# Patient Record
Sex: FEMALE | Race: WHITE | NOT HISPANIC OR LATINO | Employment: OTHER | URBAN - METROPOLITAN AREA
[De-identification: names, ages, dates, MRNs, and addresses within clinical notes are randomized per-mention and may not be internally consistent; named-entity substitution may affect disease eponyms.]

---

## 2017-01-01 ENCOUNTER — HOSPITAL ENCOUNTER (OUTPATIENT)
Dept: MAMMOGRAPHY | Facility: HOSPITAL | Age: 82
Discharge: HOME | End: 2017-03-08
Attending: FAMILY MEDICINE | Admitting: FAMILY MEDICINE

## 2017-01-01 ENCOUNTER — TRANSCRIBE ORDERS (OUTPATIENT)
Dept: FAMILY MEDICINE CLINIC | Facility: CLINIC | Age: 82
End: 2017-01-01

## 2017-01-01 ENCOUNTER — OFFICE VISIT (OUTPATIENT)
Dept: CARDIOLOGY | Facility: CLINIC | Age: 82
End: 2017-01-01

## 2017-01-01 ENCOUNTER — HOSPITAL ENCOUNTER (OUTPATIENT)
Dept: CARDIOLOGY | Facility: HOSPITAL | Age: 82
Discharge: HOME | End: 2017-10-11
Attending: INTERNAL MEDICINE | Admitting: INTERNAL MEDICINE

## 2017-01-01 ENCOUNTER — OFFICE VISIT (OUTPATIENT)
Dept: FAMILY MEDICINE CLINIC | Facility: CLINIC | Age: 82
End: 2017-01-01

## 2017-01-01 ENCOUNTER — TELEPHONE (OUTPATIENT)
Dept: PAIN MEDICINE | Facility: CLINIC | Age: 82
End: 2017-01-01

## 2017-01-01 ENCOUNTER — TELEPHONE (OUTPATIENT)
Dept: CARDIOLOGY | Facility: CLINIC | Age: 82
End: 2017-01-01

## 2017-01-01 ENCOUNTER — TELEPHONE (OUTPATIENT)
Dept: FAMILY MEDICINE CLINIC | Facility: CLINIC | Age: 82
End: 2017-01-01

## 2017-01-01 ENCOUNTER — OFFICE VISIT (OUTPATIENT)
Dept: PAIN MEDICINE | Facility: CLINIC | Age: 82
End: 2017-01-01

## 2017-01-01 VITALS
SYSTOLIC BLOOD PRESSURE: 163 MMHG | HEART RATE: 79 BPM | WEIGHT: 157.4 LBS | BODY MASS INDEX: 30.9 KG/M2 | DIASTOLIC BLOOD PRESSURE: 67 MMHG | HEIGHT: 60 IN

## 2017-01-01 VITALS
WEIGHT: 156 LBS | HEIGHT: 60 IN | SYSTOLIC BLOOD PRESSURE: 132 MMHG | OXYGEN SATURATION: 96 % | BODY MASS INDEX: 30.63 KG/M2 | DIASTOLIC BLOOD PRESSURE: 80 MMHG | RESPIRATION RATE: 18 BRPM | HEART RATE: 75 BPM | TEMPERATURE: 97.6 F

## 2017-01-01 VITALS
BODY MASS INDEX: 30.43 KG/M2 | WEIGHT: 155 LBS | HEIGHT: 60 IN | OXYGEN SATURATION: 94 % | HEART RATE: 71 BPM | DIASTOLIC BLOOD PRESSURE: 80 MMHG | SYSTOLIC BLOOD PRESSURE: 138 MMHG | TEMPERATURE: 99.1 F

## 2017-01-01 DIAGNOSIS — K21.9 GASTROESOPHAGEAL REFLUX DISEASE WITHOUT ESOPHAGITIS: ICD-10-CM

## 2017-01-01 DIAGNOSIS — F32.9 REACTIVE DEPRESSION: ICD-10-CM

## 2017-01-01 DIAGNOSIS — Z12.31 VISIT FOR SCREENING MAMMOGRAM: Primary | ICD-10-CM

## 2017-01-01 DIAGNOSIS — G47.00 INSOMNIA, UNSPECIFIED TYPE: ICD-10-CM

## 2017-01-01 DIAGNOSIS — I25.10 CORONARY ARTERY DISEASE INVOLVING NATIVE CORONARY ARTERY OF NATIVE HEART WITHOUT ANGINA PECTORIS: Primary | ICD-10-CM

## 2017-01-01 DIAGNOSIS — E10.8 DIABETES MELLITUS TYPE 1 WITH COMPLICATIONS (HCC): ICD-10-CM

## 2017-01-01 DIAGNOSIS — M48.062 LUMBAR STENOSIS WITH NEUROGENIC CLAUDICATION: ICD-10-CM

## 2017-01-01 DIAGNOSIS — I48.91 ATRIAL FIBRILLATION, UNSPECIFIED TYPE (HCC): ICD-10-CM

## 2017-01-01 DIAGNOSIS — F32.A ANXIETY AND DEPRESSION: ICD-10-CM

## 2017-01-01 DIAGNOSIS — Z95.1 S/P CABG X 3: ICD-10-CM

## 2017-01-01 DIAGNOSIS — F41.9 ANXIETY AND DEPRESSION: ICD-10-CM

## 2017-01-01 DIAGNOSIS — I21.4 NSTEMI (NON-ST ELEVATED MYOCARDIAL INFARCTION) (HCC): ICD-10-CM

## 2017-01-01 DIAGNOSIS — I10 ESSENTIAL HYPERTENSION: ICD-10-CM

## 2017-01-01 DIAGNOSIS — F41.9 ANXIETY AND DEPRESSION: Primary | ICD-10-CM

## 2017-01-01 DIAGNOSIS — I35.0 NONRHEUMATIC AORTIC VALVE STENOSIS: ICD-10-CM

## 2017-01-01 DIAGNOSIS — E10.40 TYPE 1 DIABETES MELLITUS WITH DIABETIC NEUROPATHY (HCC): Primary | ICD-10-CM

## 2017-01-01 DIAGNOSIS — M79.18 MYOFASCIAL PAIN: ICD-10-CM

## 2017-01-01 DIAGNOSIS — G56.03 CARPAL TUNNEL SYNDROME, BILATERAL: ICD-10-CM

## 2017-01-01 DIAGNOSIS — Z12.31 VISIT FOR SCREENING MAMMOGRAM: ICD-10-CM

## 2017-01-01 DIAGNOSIS — Z51.89 ENCOUNTER FOR OTHER SPECIFIED AFTERCARE: ICD-10-CM

## 2017-01-01 DIAGNOSIS — E10.42 DIABETIC POLYNEUROPATHY ASSOCIATED WITH TYPE 1 DIABETES MELLITUS (HCC): ICD-10-CM

## 2017-01-01 DIAGNOSIS — Z01.812 PRE-PROCEDURE LAB EXAM: ICD-10-CM

## 2017-01-01 DIAGNOSIS — K43.9 VENTRAL HERNIA WITHOUT OBSTRUCTION OR GANGRENE: ICD-10-CM

## 2017-01-01 DIAGNOSIS — F32.A ANXIETY AND DEPRESSION: Primary | ICD-10-CM

## 2017-01-01 LAB
BH CV ECHO MEAS - AO MAX PG (FULL): 29.1 MMHG
BH CV ECHO MEAS - AO MAX PG: 34.1 MMHG
BH CV ECHO MEAS - AO MEAN PG (FULL): 18.3 MMHG
BH CV ECHO MEAS - AO MEAN PG: 21.4 MMHG
BH CV ECHO MEAS - AO ROOT AREA (BSA CORRECTED): 1.4
BH CV ECHO MEAS - AO ROOT AREA: 4.2 CM^2
BH CV ECHO MEAS - AO ROOT DIAM: 2.3 CM
BH CV ECHO MEAS - AO V2 MAX: 291.8 CM/SEC
BH CV ECHO MEAS - AO V2 MEAN: 222.6 CM/SEC
BH CV ECHO MEAS - AO V2 VTI: 77.6 CM
BH CV ECHO MEAS - AVA(I,A): 1.3 CM^2
BH CV ECHO MEAS - AVA(I,D): 1.3 CM^2
BH CV ECHO MEAS - AVA(V,A): 1.2 CM^2
BH CV ECHO MEAS - AVA(V,D): 1.2 CM^2
BH CV ECHO MEAS - BSA(HAYCOCK): 1.7 M^2
BH CV ECHO MEAS - BSA: 1.7 M^2
BH CV ECHO MEAS - BZI_BMI: 29.5 KILOGRAMS/M^2
BH CV ECHO MEAS - BZI_METRIC_HEIGHT: 152.4 CM
BH CV ECHO MEAS - BZI_METRIC_WEIGHT: 68.5 KG
BH CV ECHO MEAS - CONTRAST EF (2CH): 50 ML/M^2
BH CV ECHO MEAS - CONTRAST EF 4CH: 57.7 ML/M^2
BH CV ECHO MEAS - EDV(CUBED): 108.3 ML
BH CV ECHO MEAS - EDV(MOD-SP2): 22 ML
BH CV ECHO MEAS - EDV(MOD-SP4): 26 ML
BH CV ECHO MEAS - EDV(TEICH): 105.8 ML
BH CV ECHO MEAS - EF(CUBED): 64.6 %
BH CV ECHO MEAS - EF(MOD-SP2): 50 %
BH CV ECHO MEAS - EF(MOD-SP4): 57.7 %
BH CV ECHO MEAS - EF(TEICH): 56 %
BH CV ECHO MEAS - ESV(CUBED): 38.4 ML
BH CV ECHO MEAS - ESV(MOD-SP2): 11 ML
BH CV ECHO MEAS - ESV(MOD-SP4): 11 ML
BH CV ECHO MEAS - ESV(TEICH): 46.5 ML
BH CV ECHO MEAS - FS: 29.2 %
BH CV ECHO MEAS - IVS/LVPW: 0.87
BH CV ECHO MEAS - IVSD: 0.98 CM
BH CV ECHO MEAS - LA DIMENSION: 4.7 CM
BH CV ECHO MEAS - LA/AO: 2
BH CV ECHO MEAS - LAT PEAK E' VEL: 6 CM/SEC
BH CV ECHO MEAS - LV DIASTOLIC VOL/BSA (35-75): 15.7 ML/M^2
BH CV ECHO MEAS - LV MASS(C)D: 180.5 GRAMS
BH CV ECHO MEAS - LV MASS(C)DI: 109 GRAMS/M^2
BH CV ECHO MEAS - LV MAX PG: 4.9 MMHG
BH CV ECHO MEAS - LV MEAN PG: 3.2 MMHG
BH CV ECHO MEAS - LV SYSTOLIC VOL/BSA (12-30): 6.6 ML/M^2
BH CV ECHO MEAS - LV V1 MAX: 111.1 CM/SEC
BH CV ECHO MEAS - LV V1 MEAN: 84.9 CM/SEC
BH CV ECHO MEAS - LV V1 VTI: 31.7 CM
BH CV ECHO MEAS - LVIDD: 4.8 CM
BH CV ECHO MEAS - LVIDS: 3.4 CM
BH CV ECHO MEAS - LVLD AP2: 5.1 CM
BH CV ECHO MEAS - LVLD AP4: 5.3 CM
BH CV ECHO MEAS - LVLS AP2: 4.2 CM
BH CV ECHO MEAS - LVLS AP4: 4.8 CM
BH CV ECHO MEAS - LVOT AREA (M): 3.1 CM^2
BH CV ECHO MEAS - LVOT AREA: 3.1 CM^2
BH CV ECHO MEAS - LVOT DIAM: 2 CM
BH CV ECHO MEAS - LVPWD: 1.1 CM
BH CV ECHO MEAS - MED PEAK E' VEL: 4.5 CM/SEC
BH CV ECHO MEAS - MV DEC SLOPE: 828.6 CM/SEC^2
BH CV ECHO MEAS - MV MAX PG: 12.9 MMHG
BH CV ECHO MEAS - MV MEAN PG: 6.1 MMHG
BH CV ECHO MEAS - MV P1/2T MAX VEL: 182.5 CM/SEC
BH CV ECHO MEAS - MV P1/2T: 64.5 MSEC
BH CV ECHO MEAS - MV V2 MAX: 179.5 CM/SEC
BH CV ECHO MEAS - MV V2 MEAN: 114.7 CM/SEC
BH CV ECHO MEAS - MV V2 VTI: 51.3 CM
BH CV ECHO MEAS - MVA P1/2T LCG: 1.2 CM^2
BH CV ECHO MEAS - MVA(P1/2T): 3.4 CM^2
BH CV ECHO MEAS - MVA(VTI): 1.9 CM^2
BH CV ECHO MEAS - PA ACC SLOPE: 557 CM/SEC^2
BH CV ECHO MEAS - PA ACC TIME: 0.13 SEC
BH CV ECHO MEAS - PA PR(ACCEL): 18.8 MMHG
BH CV ECHO MEAS - RAP SYSTOLE: 3 MMHG
BH CV ECHO MEAS - RVSP: 31 MMHG
BH CV ECHO MEAS - SI(AO): 195.4 ML/M^2
BH CV ECHO MEAS - SI(CUBED): 42.2 ML/M^2
BH CV ECHO MEAS - SI(LVOT): 59.4 ML/M^2
BH CV ECHO MEAS - SI(MOD-SP2): 6.6 ML/M^2
BH CV ECHO MEAS - SI(MOD-SP4): 9.1 ML/M^2
BH CV ECHO MEAS - SI(TEICH): 35.8 ML/M^2
BH CV ECHO MEAS - SV(AO): 323.6 ML
BH CV ECHO MEAS - SV(CUBED): 69.9 ML
BH CV ECHO MEAS - SV(LVOT): 98.3 ML
BH CV ECHO MEAS - SV(MOD-SP2): 11 ML
BH CV ECHO MEAS - SV(MOD-SP4): 15 ML
BH CV ECHO MEAS - SV(TEICH): 59.2 ML
BH CV ECHO MEAS - TAPSE (>1.6): 1.1 CM2
BH CV ECHO MEAS - TR MAX VEL: 285.8 CM/SEC
BH CV XLRA - RV BASE: 2.6 CM
BH CV XLRA - RV LENGTH: 4.5 CM
BH CV XLRA - RV MID: 1.9 CM
BH CV XLRA - TDI S': 9.2 CM/SEC
HBA1C MFR BLD: 7.6 %
LEFT ATRIUM VOLUME INDEX: 29 ML/M2

## 2017-01-01 PROCEDURE — G0202 SCR MAMMO BI INCL CAD: HCPCS

## 2017-01-01 PROCEDURE — 77063 BREAST TOMOSYNTHESIS BI: CPT | Performed by: RADIOLOGY

## 2017-01-01 PROCEDURE — 93306 TTE W/DOPPLER COMPLETE: CPT | Performed by: INTERNAL MEDICINE

## 2017-01-01 PROCEDURE — G0202 SCR MAMMO BI INCL CAD: HCPCS | Performed by: RADIOLOGY

## 2017-01-01 PROCEDURE — 83036 HEMOGLOBIN GLYCOSYLATED A1C: CPT | Performed by: FAMILY MEDICINE

## 2017-01-01 PROCEDURE — 93306 TTE W/DOPPLER COMPLETE: CPT

## 2017-01-01 PROCEDURE — 99204 OFFICE O/P NEW MOD 45 MIN: CPT | Performed by: ANESTHESIOLOGY

## 2017-01-01 PROCEDURE — 99213 OFFICE O/P EST LOW 20 MIN: CPT | Performed by: FAMILY MEDICINE

## 2017-01-01 PROCEDURE — 77063 BREAST TOMOSYNTHESIS BI: CPT

## 2017-01-01 PROCEDURE — 99214 OFFICE O/P EST MOD 30 MIN: CPT | Performed by: INTERNAL MEDICINE

## 2017-01-01 RX ORDER — RANITIDINE 150 MG/1
150 TABLET ORAL 2 TIMES DAILY
Qty: 60 TABLET | Refills: 11 | Status: SHIPPED | OUTPATIENT
Start: 2017-01-01

## 2017-01-01 RX ORDER — RANITIDINE 150 MG/1
150 TABLET ORAL 2 TIMES DAILY
Qty: 60 TABLET | Refills: 0 | Status: SHIPPED | OUTPATIENT
Start: 2017-01-01 | End: 2017-01-01

## 2017-01-01 RX ORDER — ESCITALOPRAM OXALATE 10 MG/1
10 TABLET ORAL DAILY
Qty: 30 TABLET | Refills: 11 | Status: SHIPPED | OUTPATIENT
Start: 2017-01-01

## 2017-01-01 RX ORDER — ACETAMINOPHEN AND CODEINE PHOSPHATE 300; 30 MG/1; MG/1
1 TABLET ORAL EVERY 4 HOURS PRN
COMMUNITY
Start: 2017-01-01 | End: 2018-01-01 | Stop reason: SDUPTHER

## 2017-01-03 ENCOUNTER — OFFICE VISIT (OUTPATIENT)
Dept: CARDIAC SURGERY | Facility: CLINIC | Age: 82
End: 2017-01-03

## 2017-01-03 VITALS
WEIGHT: 158.4 LBS | HEIGHT: 60 IN | OXYGEN SATURATION: 86 % | SYSTOLIC BLOOD PRESSURE: 149 MMHG | HEART RATE: 77 BPM | BODY MASS INDEX: 31.1 KG/M2 | DIASTOLIC BLOOD PRESSURE: 75 MMHG

## 2017-01-03 DIAGNOSIS — Z95.1 S/P CABG (CORONARY ARTERY BYPASS GRAFT): Primary | ICD-10-CM

## 2017-01-03 PROCEDURE — 99024 POSTOP FOLLOW-UP VISIT: CPT | Performed by: THORACIC SURGERY (CARDIOTHORACIC VASCULAR SURGERY)

## 2017-01-03 PROCEDURE — 71020 CHG CHEST X-RAY 2 VW: CPT | Performed by: THORACIC SURGERY (CARDIOTHORACIC VASCULAR SURGERY)

## 2017-01-03 NOTE — PROGRESS NOTES
01/03/2017  Patient Information  Debora Owen                                                                                          [address] [place] [address]   1935  'PCP/Referring Physician'  Sanjiv Mo MD  [phone]  No ref. provider found    Chief Complaint   Patient presents with   • Follow-up     1 MO F/U PER SANJIV        History of Present Illness:  Mrs Owen returns to clinic after CBG X 3 on 10/27/16 for NSTEMI.  She has no complaints at today's appointment.  Debora denies chest pain or shortness of breath.  She feels better after surgery as compared to her preoperative state.        Patient Active Problem List   Diagnosis   • NSTEMI (non-ST elevated myocardial infarction)   • Type I diabetes mellitus   • S/P CABG x 3   • CAD (coronary artery disease)   • Postoperative anemia   • Atrial fibrillation   • Anxiety disorder   • Carpal tunnel syndrome, bilateral   • Depression   • Gastroesophageal reflux disease   • Insomnia   • Neuropathy   • Neuropathy of hand   • Neuropathy of upper extremity   • Osteoarthritis of both hips   • Peripheral neuropathy   • Sciatica   • SOB (shortness of breath)     Past Medical History   Diagnosis Date   • Conjunctivitis    • Diabetes mellitus    • Esophageal reflux    • Glaucoma    • Hard of hearing    • Hyperlipidemia    • Hypertension    • Upper respiratory infection    • Urinary tract infection      Past Surgical History   Procedure Laterality Date   • Cataract extraction Bilateral    • Glaucoma surgery Bilateral    • Tonsillectomy and adenoidectomy     • Cardiac catheterization N/A 10/20/2016     Procedure: Left Heart Cath +/- cbi;  Surgeon: Gama Elmore MD;  Location: [facility] INVASIVE LOCATION;  Service:    • Carpal tunnel release Bilateral      2016   • Coronary artery bypass graft N/A 10/27/2016     Procedure: TONNY, MEDIAN STERNOTOMY, CORONARY ARTERY BYPASS GRAFT X 3   WITH LEFT INTERNAL MAMMARY ARTERY GRAFT AND ENDOSCOPIC VEIN HARVEST OF  THE RIGHT GREATER SAPHENOUS VEIN, WITH EXPLORATION OF THE LEFT GREATER SAPHENOUS VEIN;  Surgeon: Christophe Noe MD;  Location: [facility];  Service:        Current Outpatient Prescriptions:   •  acetaminophen (TYLENOL) 500 MG tablet, Take 500 mg by mouth 2 (Two) Times a Day., Disp: , Rfl:   •  amiodarone (PACERONE) 200 MG tablet, Take 1 tablet by mouth Daily., Disp: 30 tablet, Rfl: 6  •  aspirin 81 MG EC tablet, Take 81 mg by mouth Daily., Disp: , Rfl:   •  Calcium-Magnesium-Vitamin D (CALCIUM 500 PO), Take 2 tablets by mouth Daily., Disp: , Rfl:   •  furosemide (LASIX) 40 MG tablet, Take 40 mg by mouth 2 (Two) Times a Day., Disp: , Rfl:   •  Glucosamine-Chondroitin (OSTEO BI-FLEX REGULAR STRENGTH PO), Take 1,500 mg by mouth Daily., Disp: , Rfl:   •  insulin aspart (NOVOLOG) 100 UNIT/ML injection, Inject  under the skin 3 (Three) Times a Day Before Meals. SSI based on carbs, Disp: , Rfl:   •  insulin glargine (LANTUS) 100 UNIT/ML injection, Inject 24 Units under the skin Every Night., Disp: , Rfl:   •  losartan (COZAAR) 25 MG tablet, Take 4 tablets by mouth Daily., Disp: 30 tablet, Rfl: 11  •  metoprolol tartrate (LOPRESSOR) 25 MG tablet, Take 0.5 tablets by mouth Every 12 (Twelve) Hours., Disp: 60 tablet, Rfl: 11  •  Multiple Vitamins-Minerals (MULTIVITAMIN ADULT PO), Take 1 tablet by mouth Daily., Disp: , Rfl:   •  pravastatin (PRAVACHOL) 20 MG tablet, Take 20 mg by mouth Daily., Disp: , Rfl:   •  ranitidine (ZANTAC) 150 MG tablet, Take 150 mg by mouth 2 (Two) Times a Day., Disp: , Rfl:   Allergies   Allergen Reactions   • Augmentin [Amoxicillin-Pot Clavulanate] Rash   • Cephalexin Rash     Social History     Social History   • Marital status:      Spouse name: N/A   • Number of children: 0   • Years of education: N/A     Occupational History   • [employer]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    Patient lives alone at her home due to  residing at the nursing home.     Family History   Problem Relation Age of Onset   • Coronary artery disease Maternal Aunt    • Coronary artery disease Maternal Uncle    • Alzheimer's disease Sister    • Macular degeneration Sister    • Cancer Mother    • Cancer Father      ROS  Vitals:    01/03/17 1137   BP: 149/75   BP Location: Right arm   Patient Position: Sitting   Pulse: 77   SpO2: (!) 86%   Weight: 158 lb 6.4 oz (71.8 kg)   Height: 60\" (152.4 cm)      Physical Exam   Constitutional: She is oriented to person, place, and time. She appears well-developed and well-nourished. No distress.   HENT:   Head: Normocephalic and atraumatic.   Eyes: Conjunctivae are normal. No scleral icterus.   Neck: Normal range of motion. No JVD present. No tracheal deviation present.   Cardiovascular: Normal rate, regular rhythm and normal heart sounds.  Exam reveals no gallop and no friction rub.    No murmur heard.  Pulmonary/Chest: Effort normal and breath sounds normal. No respiratory distress. She has no wheezes. She has no rales.   Musculoskeletal: Normal range of motion. She exhibits no deformity.   Neurological: She is alert and oriented to person, place, and time.   Skin: No rash noted. She is not diaphoretic. No erythema.   Sternotomy and vein harvest site incisions well healed with no erythema or drainage.  Sternum intact with cough   Psychiatric: She has a normal mood and affect. Her behavior is normal. Judgment and thought content normal.       Labs/Imaging:  -CXR performed in office, personally reviewed, demonstrates sharp costophrenic angles, intact sternal wires, no pleural effusions or cardiomegaly.      Assessment/Plan:  81 year old  female with a history of hypertension, hyperlipidemia and " diabetes presents after CABG x 3 on 10/27/16 for NSTEMI. I encouraged her to continue physical activity and pulmonary toilet.  Lifelong aspirin, statin and beta blocker therapy.  Debora will follow up with Dr. Elmore who may choose to discontinue the amiodarone.  She may return to surgical clinic as needed.        Patient Active Problem List   Diagnosis   • NSTEMI (non-ST elevated myocardial infarction)   • Type I diabetes mellitus   • S/P CABG x 3   • CAD (coronary artery disease)   • Postoperative anemia   • Atrial fibrillation   • Anxiety disorder   • Carpal tunnel syndrome, bilateral   • Depression   • Gastroesophageal reflux disease   • Insomnia   • Neuropathy   • Neuropathy of hand   • Neuropathy of upper extremity   • Osteoarthritis of both hips   • Peripheral neuropathy   • Sciatica   • SOB (shortness of breath)     Signed by:

## 2017-01-12 ENCOUNTER — OFFICE VISIT (OUTPATIENT)
Dept: FAMILY MEDICINE CLINIC | Facility: CLINIC | Age: 82
End: 2017-01-12

## 2017-01-12 VITALS
DIASTOLIC BLOOD PRESSURE: 72 MMHG | BODY MASS INDEX: 32.79 KG/M2 | TEMPERATURE: 98 F | HEART RATE: 75 BPM | HEIGHT: 60 IN | WEIGHT: 167 LBS | SYSTOLIC BLOOD PRESSURE: 132 MMHG | OXYGEN SATURATION: 97 %

## 2017-01-12 DIAGNOSIS — F32.9 REACTIVE DEPRESSION: ICD-10-CM

## 2017-01-12 DIAGNOSIS — G89.29 CHRONIC BILATERAL LOW BACK PAIN WITHOUT SCIATICA: Primary | ICD-10-CM

## 2017-01-12 DIAGNOSIS — I25.119 CORONARY ARTERY DISEASE INVOLVING NATIVE CORONARY ARTERY OF NATIVE HEART WITH ANGINA PECTORIS (HCC): ICD-10-CM

## 2017-01-12 DIAGNOSIS — M54.50 CHRONIC BILATERAL LOW BACK PAIN WITHOUT SCIATICA: Primary | ICD-10-CM

## 2017-01-12 PROCEDURE — 99214 OFFICE O/P EST MOD 30 MIN: CPT | Performed by: FAMILY MEDICINE

## 2017-01-12 RX ORDER — HYDROCODONE BITARTRATE AND ACETAMINOPHEN 5; 325 MG/1; MG/1
1 TABLET ORAL EVERY 8 HOURS PRN
Qty: 60 TABLET | Refills: 0
Start: 2017-01-12 | End: 2017-01-01

## 2017-01-12 RX ORDER — ESCITALOPRAM OXALATE 10 MG/1
10 TABLET ORAL DAILY
Qty: 30 TABLET | Refills: 5 | Status: SHIPPED | OUTPATIENT
Start: 2017-01-12 | End: 2017-01-01 | Stop reason: SDUPTHER

## 2017-01-12 NOTE — PROGRESS NOTES
"Raquel Owen is a 81 y.o. female.     History of Present Illness   The patient is here for a follow up on Coronary Bypass surgery in October 19 and discharged on Nov 3 to Rehab. She is accompanied today by her niece.    She states she is doing well.  Denies any chest pain or shortness of breath for about 2 weeks now.    She is went to [facility] on Nov 3 rd through Dec 21st.    She has been home for about 3 weeks now.  She states her appetite is good.    Seeing Dr Desai for the diabetes. A1c in Oct was 7.7%. She is to have a new insulin pump. Workup of this coming Monday.    Also c/o Osteoarthritis of back and both hips. She is wearing a back brace and this helps some. Having to sleep in the recliner due to back pain  She did see an Orthopedist in [place] in October.  States she is doing Physical Therapy at this time.    The following portions of the patient's history were reviewed and updated as appropriate: allergies, current medications, past social history and problem list.    Review of Systems   Constitutional: Positive for fatigue.   Musculoskeletal: Positive for back pain (low back).   Psychiatric/Behavioral: Positive for dysphoric mood (depression).       Objective   Visit Vitals   • /72   • Pulse 75   • Temp 98 °F (36.7 °C)   • Ht 60\" (152.4 cm)   • Wt 167 lb (75.8 kg)   • SpO2 97%   • BMI 32.61 kg/m2     Physical Exam   Constitutional: She appears well-developed and well-nourished.   Cardiovascular: Normal rate, regular rhythm and normal heart sounds.    Pulmonary/Chest: Effort normal and breath sounds normal.   Nursing note and vitals reviewed.      Assessment/Plan   Problem List Items Addressed This Visit        Cardiovascular and Mediastinum    CAD (coronary artery disease)       Other    Depression      Other Visit Diagnoses     Chronic bilateral low back pain without sciatica    -  Primary              Drink plenty fluids.    Start Escitalopram 10 mg daily " #30+5.    Written Rx for Hydrocodone 5 mg three times a day if needed #60+0.    Refer to Pain Management. Dr Stewart.    Follow up as needed.                                Scribed for Dr Tai Segal by Kirstin Medeiros CMA.    I, Tai Segal MD, personally performed the services described in this documentation, as scribed by Kirstin Medeiros in my presence, and is both accurate and complete.

## 2017-01-16 ENCOUNTER — OFFICE VISIT (OUTPATIENT)
Dept: CARDIOLOGY | Facility: CLINIC | Age: 82
End: 2017-01-16

## 2017-01-16 VITALS
HEART RATE: 74 BPM | BODY MASS INDEX: 32.55 KG/M2 | WEIGHT: 165.8 LBS | DIASTOLIC BLOOD PRESSURE: 58 MMHG | HEIGHT: 60 IN | SYSTOLIC BLOOD PRESSURE: 136 MMHG

## 2017-01-16 DIAGNOSIS — I48.91 ATRIAL FIBRILLATION, UNSPECIFIED TYPE (HCC): ICD-10-CM

## 2017-01-16 DIAGNOSIS — I25.10 CORONARY ARTERY DISEASE INVOLVING NATIVE CORONARY ARTERY OF NATIVE HEART WITHOUT ANGINA PECTORIS: Primary | ICD-10-CM

## 2017-01-16 DIAGNOSIS — I35.0 NONRHEUMATIC AORTIC VALVE STENOSIS: ICD-10-CM

## 2017-01-16 PROCEDURE — 99214 OFFICE O/P EST MOD 30 MIN: CPT | Performed by: INTERNAL MEDICINE

## 2017-01-16 PROCEDURE — 93000 ELECTROCARDIOGRAM COMPLETE: CPT | Performed by: INTERNAL MEDICINE

## 2017-01-16 NOTE — PROGRESS NOTES
[facility] AT [address], [address]  [place], [place], [address] [phone]  [email or web address].[facility]           OUTPATIENT CLINIC FOLLOW UP NOTE    Encounter Date:01/16/2017    Patient Care Team:  Patient Care Team:  Sanjiv Mo MD as PCP - General (Adolescent Medicine)  Tai Segal MD as PCP - Family Medicine  Christophe Noe MD as Surgeon (Cardiothoracic Surgery)  Lamberto Ellison MD as Consulting Physician (Hand Surgery)    Subjective:   Reason for consultation:   Chief Complaint   Patient presents with   • Follow-up     CAD       HPI:    Debora Owen is a 81 y.o. female.  History of Present Illness  The patient is an 81-year-old female with a history of an non-ST elevation MI in 10/2016 status post three-vessel bypass with Dr. Noe, postoperative atrial fibrillation, mild to moderate AS, who presents for follow-up.    Patient has done well since discharge and is currently without chest pain or exertional dyspnea suggestive of angina.  Additionally she has not had episodes of lightheadedness or syncope suggestive of progressive aortic stenosis.  Lastly she denies palpitations suggestive of recurrent atrial fibrillation on amiodarone therapy.      PFSH:  Patient Active Problem List   Diagnosis   • NSTEMI (non-ST elevated myocardial infarction)   • Type I diabetes mellitus   • S/P CABG x 3   • CAD (coronary artery disease)   • Postoperative anemia   • Atrial fibrillation   • Anxiety disorder   • Carpal tunnel syndrome, bilateral   • Depression   • Gastroesophageal reflux disease   • Insomnia   • Neuropathy   • Neuropathy of hand   • Neuropathy of upper extremity   • Osteoarthritis of both hips   • Peripheral neuropathy   • Sciatica   • SOB (shortness of breath)         Current Outpatient Prescriptions:   •  acetaminophen (TYLENOL) 500 MG tablet, Take 500 mg by mouth 2 (Two) Times a Day., Disp: , Rfl:   •  amiodarone (PACERONE) 200 MG tablet, Take 1 tablet by  mouth Daily., Disp: 30 tablet, Rfl: 6  •  aspirin 81 MG EC tablet, Take 81 mg by mouth Daily., Disp: , Rfl:   •  Calcium-Magnesium-Vitamin D (CALCIUM 500 PO), Take 2 tablets by mouth Daily., Disp: , Rfl:   •  escitalopram (LEXAPRO) 10 MG tablet, Take 1 tablet by mouth Daily., Disp: 30 tablet, Rfl: 5  •  furosemide (LASIX) 40 MG tablet, Take 40 mg by mouth 2 (Two) Times a Day., Disp: , Rfl:   •  Glucosamine-Chondroitin (OSTEO BI-FLEX REGULAR STRENGTH PO), Take 1,500 mg by mouth Daily., Disp: , Rfl:   •  HYDROcodone-acetaminophen (NORCO) 5-325 MG per tablet, Take 1 tablet by mouth Every 8 (Eight) Hours As Needed for moderate pain (4-6)., Disp: 60 tablet, Rfl: 0  •  insulin aspart (NOVOLOG) 100 UNIT/ML injection, Inject  under the skin 3 (Three) Times a Day Before Meals. SSI based on carbs, Disp: , Rfl:   •  insulin glargine (LANTUS) 100 UNIT/ML injection, Inject 24 Units under the skin Every Night., Disp: , Rfl:   •  losartan (COZAAR) 25 MG tablet, Take 4 tablets by mouth Daily., Disp: 30 tablet, Rfl: 11  •  metoprolol tartrate (LOPRESSOR) 25 MG tablet, Take 0.5 tablets by mouth Every 12 (Twelve) Hours., Disp: 60 tablet, Rfl: 11  •  Multiple Vitamins-Minerals (MULTIVITAMIN ADULT PO), Take 1 tablet by mouth Daily., Disp: , Rfl:   •  pravastatin (PRAVACHOL) 20 MG tablet, Take 20 mg by mouth Daily., Disp: , Rfl:   •  ranitidine (ZANTAC) 150 MG tablet, Take 150 mg by mouth 2 (Two) Times a Day., Disp: , Rfl:     Allergies   Allergen Reactions   • Augmentin [Amoxicillin-Pot Clavulanate] Rash   • Cephalexin Rash        reports that she has never smoked. She has never used smokeless tobacco.    Family History   Problem Relation Age of Onset   • Coronary artery disease Maternal Aunt    • Coronary artery disease Maternal Uncle    • Alzheimer's disease Sister    • Macular degeneration Sister    • Cancer Mother    • Cancer Father        Review of Systems:  Negative for exertional chest pain, dyspnea with exertion, orthopnea, PND,  "lower extremity edema, palpitations, lightheadedness, syncope.   Review of Systems  All other systems reviewed and are negative.    Objective:   Blood pressure 136/58, pulse 74, height 60\" (152.4 cm), weight 165 lb 12.8 oz (75.2 kg).  Physical Exam  CONSTITUTIONAL: No acute distress, normal affect  RESPIRATORY: Normal effort. Clear to auscultation bilaterally without wheezing or rales  CARDIOVASCULAR: Jugular venous pressure within normal limits. Carotids with normal upstrokes without bruits.  Regular rate and rhythm with normal S1. NAHOMI at RUSB with radiation to carotid. Soft but present S2. Without gallop or rub.  CHEST: Incision CDI  PERIPHERAL VASCULAR: Normal radial pulses bilaterally. There is no lower extremity edema bilaterally.    Labs:  Lab Results   Component Value Date    ALT 20 10/23/2016    AST 23 10/23/2016     Lab Results   Component Value Date    CHOL 190 10/19/2016    TRIG 124 10/19/2016    HDL 61 (H) 10/19/2016    LDLDIRECT 109 10/19/2016    CREATININE 0.90 10/31/2016       Diagnostic Data:      ECG 12 Lead  Date/Time: 1/16/2017 3:37 PM  Performed by: JARED HWANG  Authorized by: JARED HWANG   Rhythm: sinus rhythm  Q waves: V1, V2 and V3  Comments: Anteroseptal infarct        Cath 10/2016  · Severe multi-vessel CAD involving the proximal LAD and mid circumflex  · Ostial 60% LAD stenosis followed by a 100% occluded proximal LAD just distal to the take off of D1 with predominantly right to left collateral filling of the mid to distal LAD.  · Functionally significant 60% mid circumflex stenosis with an iFR of 0.86  · Normal LVEF 60% with concentric hypertrophy but with no regional wall abnormalities  · Mitral annular calcification  · Mild aortic stenosis with a peak-to-peak gradient of 20mmHg    TTE 10/2016  · Left ventricular function is normal. Estimated EF = 65%.  · All left ventricular wall segments contract normally.  · Left ventricular diastolic dysfunction (grade I a) consistent with " impaired relaxation.  · Mild mitral valve stenosis is present  · Mild to moderate aortic valve stenosis is present.    Assessment and Plan:   Debora was seen today for follow-up.    Diagnoses and all orders for this visit:    Coronary artery disease involving native coronary artery of native heart without angina pectoris  -CCS class 0, continue current CAD medications  -On metoprolol, but has felt potentially a little bit fatigued or down in her mood since discharge.  Possibly secondary to the metoprolol but could simply be depression.  She was recently started on antidepressant.  Will consider discontinue metoprolol in the future if continues to do well.  She has a normal left reticular ejection fraction.    Atrial fibrillation, unspecified type  -Very brief postoperative age of fibrillation which reverted quickly to normal sinus rhythm on amiodarone.  Discontinue amiodarone by mouth    Aortic Stenosis  -Mild to moderate in severity, no symptoms of aortic stenosis, clinical monitoring    - Dietary and exercise counseling was provided during this visit  - Return in about 6 months (around 7/16/2017).    Gama Elmore MD, MSc, [facility]  Interventional Cardiology  [facility] at [facility]

## 2017-01-16 NOTE — MR AVS SNAPSHOT
Debora Owen   1/16/2017 3:00 PM   Office Visit    Dept Phone:  [phone]   Encounter #:  [id number]    Provider:  Gama Elmore MD   Department:  [facility]                Your Full Care Plan              Today's Medication Changes          These changes are accurate as of: 1/16/17  3:48 PM.  If you have any questions, ask your nurse or doctor.               Stop taking medication(s)listed here:     amiodarone 200 MG tablet   Commonly known as:  PACERONE   Stopped by:  Gama Elmore MD                      Your Updated Medication List          This list is accurate as of: 1/16/17  3:48 PM.  Always use your most recent med list.                acetaminophen 500 MG tablet   Commonly known as:  TYLENOL       aspirin 81 MG EC tablet       CALCIUM 500 PO       escitalopram 10 MG tablet   Commonly known as:  LEXAPRO   Take 1 tablet by mouth Daily.       furosemide 40 MG tablet   Commonly known as:  LASIX       HYDROcodone-acetaminophen 5-325 MG per tablet   Commonly known as:  NORCO   Take 1 tablet by mouth Every 8 (Eight) Hours As Needed for moderate pain (4-6).       insulin glargine 100 UNIT/ML injection   Commonly known as:  LANTUS       losartan 25 MG tablet   Commonly known as:  COZAAR   Take 4 tablets by mouth Daily.       metoprolol tartrate 25 MG tablet   Commonly known as:  LOPRESSOR   Take 0.5 tablets by mouth Every 12 (Twelve) Hours.       MULTIVITAMIN ADULT PO       NOVOLOG 100 UNIT/ML injection   Generic drug:  insulin aspart       OSTEO BI-FLEX REGULAR STRENGTH PO       pravastatin 20 MG tablet   Commonly known as:  PRAVACHOL       raNITIdine 150 MG tablet   Commonly known as:  ZANTAC               We Performed the Following     ECG 12 Lead       You Were Diagnosed With        Codes Comments    Coronary artery disease involving native coronary artery of native heart without angina pectoris    -  Primary ICD-10-CM: I25.10  ICD-9-CM:  "414.01     Atrial fibrillation, unspecified type     ICD-10-CM: I48.91  ICD-9-CM: 427.31       Instructions     None    Patient Instructions History      Upcoming Appointments     Visit Type Date Time Department    FOLLOW UP 1/16/2017  3:00 PM [facility]    FOLLOW UP 7/17/2017  1:45 PM [facility]      MyChart Signup     Our records indicate that you have declined [facility] MyChart signup. If you would like to sign up for [employer], please email [email or web address] or call [phone] to obtain an activation code.             Other Info from Your Visit           Your Appointments     Jul 17, 2017  1:45 PM EDT   Follow Up with Gama Elmore MD   [facility] (--)    [address] [place] [address] [address] [place] [id number] [phone]           Arrive 15 minutes prior to appointment.              Allergies     Augmentin [Amoxicillin-pot Clavulanate]  Rash    Cephalexin  Rash      Reason for Visit     Follow-up CAD      Vital Signs     Blood Pressure Pulse Height Weight Body Mass Index Smoking Status    136/58 (BP Location: Left arm, Patient Position: Sitting) 74 60\" (152.4 cm) 165 lb 12.8 oz (75.2 kg) 32.38 kg/m2 Never Smoker      Problems and Diagnoses Noted     Atrial fibrillation (irregular heartbeat)    Coronary heart disease        "

## 2017-01-16 NOTE — LETTER
January 16, 2017     Sanjiv Mo MD  [address] [facility] [address]  [facility] [address]    Patient: Debora Owen   YOB: 1935   Date of Visit: 1/16/2017       Dear Dr. Chely MD:    Thank you for referring Debora Owen to me for evaluation. Below are the relevant portions of my assessment and plan of care.    If you have questions, please do not hesitate to call me. I look forward to following Debora along with you.         Sincerely,        Gama Elmore MD        CC: No Recipients  Gama Elmore MD  1/16/2017  3:53 PM  Signed   [facility] AT [address], [address]  [place], [place], [address] [phone]  [email or web address] [email or web address]           OUTPATIENT CLINIC FOLLOW UP NOTE    Encounter Date:01/16/2017    Patient Care Team:  Patient Care Team:  Sanjiv Mo MD as PCP - General (Adolescent Medicine)  Tai Segal MD as PCP - Family Medicine  Christophe Noe MD as Surgeon (Cardiothoracic Surgery)  Lamberto Ellison MD as Consulting Physician (Hand Surgery)    Subjective:   Reason for consultation:   Chief Complaint   Patient presents with   • Follow-up     CAD       HPI:    Debora Owen is a 81 y.o. female.  History of Present Illness  The patient is an 81-year-old female with a history of an non-ST elevation MI in 10/2016 status post three-vessel bypass with Dr. Noe, postoperative atrial fibrillation, mild to moderate AS, who presents for follow-up.    Patient has done well since discharge and is currently without chest pain or exertional dyspnea suggestive of angina.  Additionally she has not had episodes of lightheadedness or syncope suggestive of progressive aortic stenosis.  Lastly she denies palpitations suggestive of recurrent atrial fibrillation on amiodarone therapy.      PFSH:  Patient Active Problem List   Diagnosis   • NSTEMI (non-ST elevated myocardial infarction)   • Type I diabetes mellitus   • S/P CABG x 3   • CAD (coronary artery  disease)   • Postoperative anemia   • Atrial fibrillation   • Anxiety disorder   • Carpal tunnel syndrome, bilateral   • Depression   • Gastroesophageal reflux disease   • Insomnia   • Neuropathy   • Neuropathy of hand   • Neuropathy of upper extremity   • Osteoarthritis of both hips   • Peripheral neuropathy   • Sciatica   • SOB (shortness of breath)         Current Outpatient Prescriptions:   •  acetaminophen (TYLENOL) 500 MG tablet, Take 500 mg by mouth 2 (Two) Times a Day., Disp: , Rfl:   •  amiodarone (PACERONE) 200 MG tablet, Take 1 tablet by mouth Daily., Disp: 30 tablet, Rfl: 6  •  aspirin 81 MG EC tablet, Take 81 mg by mouth Daily., Disp: , Rfl:   •  Calcium-Magnesium-Vitamin D (CALCIUM 500 PO), Take 2 tablets by mouth Daily., Disp: , Rfl:   •  escitalopram (LEXAPRO) 10 MG tablet, Take 1 tablet by mouth Daily., Disp: 30 tablet, Rfl: 5  •  furosemide (LASIX) 40 MG tablet, Take 40 mg by mouth 2 (Two) Times a Day., Disp: , Rfl:   •  Glucosamine-Chondroitin (OSTEO BI-FLEX REGULAR STRENGTH PO), Take 1,500 mg by mouth Daily., Disp: , Rfl:   •  HYDROcodone-acetaminophen (NORCO) 5-325 MG per tablet, Take 1 tablet by mouth Every 8 (Eight) Hours As Needed for moderate pain (4-6)., Disp: 60 tablet, Rfl: 0  •  insulin aspart (NOVOLOG) 100 UNIT/ML injection, Inject  under the skin 3 (Three) Times a Day Before Meals. SSI based on carbs, Disp: , Rfl:   •  insulin glargine (LANTUS) 100 UNIT/ML injection, Inject 24 Units under the skin Every Night., Disp: , Rfl:   •  losartan (COZAAR) 25 MG tablet, Take 4 tablets by mouth Daily., Disp: 30 tablet, Rfl: 11  •  metoprolol tartrate (LOPRESSOR) 25 MG tablet, Take 0.5 tablets by mouth Every 12 (Twelve) Hours., Disp: 60 tablet, Rfl: 11  •  Multiple Vitamins-Minerals (MULTIVITAMIN ADULT PO), Take 1 tablet by mouth Daily., Disp: , Rfl:   •  pravastatin (PRAVACHOL) 20 MG tablet, Take 20 mg by mouth Daily., Disp: , Rfl:   •  ranitidine (ZANTAC) 150 MG tablet, Take 150 mg by mouth 2  "(Two) Times a Day., Disp: , Rfl:     Allergies   Allergen Reactions   • Augmentin [Amoxicillin-Pot Clavulanate] Rash   • Cephalexin Rash        reports that she has never smoked. She has never used smokeless tobacco.    Family History   Problem Relation Age of Onset   • Coronary artery disease Maternal Aunt    • Coronary artery disease Maternal Uncle    • Alzheimer's disease Sister    • Macular degeneration Sister    • Cancer Mother    • Cancer Father        Review of Systems:  Negative for exertional chest pain, dyspnea with exertion, orthopnea, PND, lower extremity edema, palpitations, lightheadedness, syncope.   Review of Systems  All other systems reviewed and are negative.    Objective:   Blood pressure 136/58, pulse 74, height 60\" (152.4 cm), weight 165 lb 12.8 oz (75.2 kg).  Physical Exam  CONSTITUTIONAL: No acute distress, normal affect  RESPIRATORY: Normal effort. Clear to auscultation bilaterally without wheezing or rales  CARDIOVASCULAR: Jugular venous pressure within normal limits. Carotids with normal upstrokes without bruits.  Regular rate and rhythm with normal S1. NAHOMI at RUSB with radiation to carotid. Soft but present S2. Without gallop or rub.  CHEST: Incision CDI  PERIPHERAL VASCULAR: Normal radial pulses bilaterally. There is no lower extremity edema bilaterally.    Labs:  Lab Results   Component Value Date    ALT 20 10/23/2016    AST 23 10/23/2016     Lab Results   Component Value Date    CHOL 190 10/19/2016    TRIG 124 10/19/2016    HDL 61 (H) 10/19/2016    LDLDIRECT 109 10/19/2016    CREATININE 0.90 10/31/2016       Diagnostic Data:      ECG 12 Lead  Date/Time: 1/16/2017 3:37 PM  Performed by: JARED HWANG  Authorized by: JARED HWANG   Rhythm: sinus rhythm  Q waves: V1, V2 and V3  Comments: Anteroseptal infarct        Cath 10/2016  · Severe multi-vessel CAD involving the proximal LAD and mid circumflex  · Ostial 60% LAD stenosis followed by a 100% occluded proximal LAD just distal to " the take off of D1 with predominantly right to left collateral filling of the mid to distal LAD.  · Functionally significant 60% mid circumflex stenosis with an iFR of 0.86  · Normal LVEF 60% with concentric hypertrophy but with no regional wall abnormalities  · Mitral annular calcification  · Mild aortic stenosis with a peak-to-peak gradient of 20mmHg    TTE 10/2016  · Left ventricular function is normal. Estimated EF = 65%.  · All left ventricular wall segments contract normally.  · Left ventricular diastolic dysfunction (grade I a) consistent with impaired relaxation.  · Mild mitral valve stenosis is present  · Mild to moderate aortic valve stenosis is present.    Assessment and Plan:   Debora was seen today for follow-up.    Diagnoses and all orders for this visit:    Coronary artery disease involving native coronary artery of native heart without angina pectoris  -CCS class 0, continue current CAD medications  -On metoprolol, but has felt potentially a little bit fatigued or down in her mood since discharge.  Possibly secondary to the metoprolol but could simply be depression.  She was recently started on antidepressant.  Will consider discontinue metoprolol in the future if continues to do well.  She has a normal left reticular ejection fraction.    Atrial fibrillation, unspecified type  -Very brief postoperative age of fibrillation which reverted quickly to normal sinus rhythm on amiodarone.  Discontinue amiodarone by mouth    Aortic Stenosis  -Mild to moderate in severity, no symptoms of aortic stenosis, clinical monitoring    - Dietary and exercise counseling was provided during this visit  - Return in about 6 months (around 7/16/2017).    Gama Elmore MD, MSc, [facility]  Interventional Cardiology  [facility]

## 2017-01-30 RX ORDER — RANITIDINE 150 MG/1
TABLET ORAL
Qty: 60 TABLET | Refills: 0 | Status: SHIPPED | OUTPATIENT
Start: 2017-01-30 | End: 2017-01-01 | Stop reason: SDUPTHER

## 2017-02-28 PROBLEM — M48.062 LUMBAR STENOSIS WITH NEUROGENIC CLAUDICATION: Status: ACTIVE | Noted: 2017-01-01

## 2017-02-28 PROBLEM — F41.9 ANXIETY AND DEPRESSION: Status: ACTIVE | Noted: 2017-01-01

## 2017-02-28 PROBLEM — E10.42 DIABETIC POLYNEUROPATHY ASSOCIATED WITH TYPE 1 DIABETES MELLITUS (HCC): Status: ACTIVE | Noted: 2017-01-01

## 2017-02-28 PROBLEM — M79.18 MYOFASCIAL PAIN: Status: ACTIVE | Noted: 2017-01-01

## 2017-02-28 PROBLEM — F32.A ANXIETY AND DEPRESSION: Status: ACTIVE | Noted: 2017-01-01

## 2017-02-28 PROBLEM — E10.8 DIABETES MELLITUS TYPE 1 WITH COMPLICATIONS (HCC): Status: ACTIVE | Noted: 2017-01-01

## 2017-02-28 NOTE — PROGRESS NOTES
"Chief Complaint: \"Lower back pain.\"     History of Present Illness:   Patient: Ms. Debora Owen, 81 y.o. female   Referring physician: Dr. Tai Segal   Reason for referral: Consultation for intractable chronic lower back pain.   Pain history: She reports a seven year history of pain, which began without incident. Pain has progressed in intensity over the past 3 years.   Pain description: intermittent pain, described as aching sensation.   Radiation of pain: The pain does not radiate.  Pain intensity today: 0/10 (sitting)  Pain intensity ranges from: 0/10 to 10/10 (standing or walking)   Aggravating factors: Pain increases with standing longer than 5 minutes and ambulating more than 5 minutes.  Alleviating factors: Pain decreases with sitting  Associated symptoms:   Patient denies pain, numbness and weakness in the lower extremities. Patient describes classic neurogenic claudication.  Patient denies any new bladder or bowel problems.   Patient denies difficulties with her balance. Patient denies falls.     Review of previous therapies and additional medical records:  Debora Owen has already failed the following measures, including:   Conservative measures: oral analgesics, physical therapy, ice, heat and chiropractic therapy   Interventional measures: Dr. Newton performed L4-L5 lumbar interlaminar epidural steroid injection on 1/9/2015, and 4/23 2014, without significant analgesic and/or functional benefit and with problems with her diabetes. Patient discussed with the possibility of lumbar surgery, although risk was high for the patient and surgery was not pursued.  Surgical measures: No previous spine surgery. Patient underwent treatment and consultation in [id number], with Dr. Jamie Newton.  Based on comorbidity risk, Dr. Newton decided against surgery. Patient underwent consultation with Dr. Kehinde Bermudez M.D. who discussed with the patient the possibility of lumbar decompression and fusion.  Patient declined " surgery.  Date of consultation September 30, 2016.  Debora Owen presents with significant comorbidities including IDDM on insulin pump, hyperlipidemia, CABG ×3 on October 27, 2016, depression and anxiety, engaged in treatment.  In terms of current analgesics, Debora Owen takes: Tylenol and Hydrocodone  I have reviewed [facility] Report #[id number] consistent to medication reconciliation.    Review of diagnostic Studies:    MRI of the lumbar spine October 21, 2013, Revealed the following:  L1-L2: Slight retrolisthesis.  Broad-based disc bulge and facet hypertrophy creating mild right and moderate left neuroforaminal stenosis.  L2/L3: Posterior disc bulge worse on the left.  Mild right and mild to moderate left neuroforaminal stenosis with mild central canal stenosis.  L3-L4: Disc protrusion into the superior endplate of L4.  Broad-based disc bulge worse on the left.  Moderate left and mild to moderate right neuroforaminal stenosis.  Moderate central canal stenosis.  Narrowing of the bilateral lateral recesses, worse on the right.  Effacement of the left L3 nerve root and nerve root ganglion within the left neuroforamina.  Effacement of the right L4 nerve root within the right lateral recess.  L4-L5: Modic 1 endplate changes.  Broad-based disc bulge combined with facet and ligamentum flavum hypertrophy creating mild to moderate bilateral neuroforaminal stenosis and moderate central canal stenosis.  Narrowing of the lateral recesses, right worse than left.  Effacement of the L5 nerve roots within the lateral recesses, bilaterally.  L5-S1 posterior disc bulge with advanced degenerative disc disease.  Facet hypertrophy.  Mild to moderate bilateral neuroforaminal stenosis without significant canal stenosis.    Review of Systems   Constitutional: Positive for fatigue.   HENT: Positive for hearing loss.    Cardiovascular: Positive for chest pain.   Musculoskeletal: Positive for arthralgias, back pain and gait problem.    Psychiatric/Behavioral: Positive for sleep disturbance. The patient is nervous/anxious (depression).    All other systems reviewed and are negative.        Patient Active Problem List   Diagnosis   • NSTEMI (non-ST elevated myocardial infarction)   • Type I diabetes mellitus   • S/P CABG x 3   • CAD (coronary artery disease)   • Postoperative anemia   • Atrial fibrillation   • Carpal tunnel syndrome, bilateral   • Gastroesophageal reflux disease   • Insomnia   • Neuropathy of hand   • Neuropathy of upper extremity   • Peripheral neuropathy   • SOB (shortness of breath)   • Nonrheumatic aortic valve stenosis   • Anxiety and depression   • Lumbar stenosis with neurogenic claudication   • Myofascial pain   • Diabetes mellitus type 1 with complications   • Diabetic polyneuropathy associated with type 1 diabetes mellitus       Past Medical History   Diagnosis Date   • Conjunctivitis    • Diabetes mellitus    • Esophageal reflux    • Glaucoma    • Hard of hearing    • Hyperlipidemia    • Hypertension    • Upper respiratory infection    • Urinary tract infection          Past Surgical History   Procedure Laterality Date   • Cataract extraction Bilateral    • Glaucoma surgery Bilateral    • Tonsillectomy and adenoidectomy     • Cardiac catheterization N/A 10/20/2016     Procedure: Left Heart Cath +/- cbi;  Surgeon: Gama Elmore MD;  Location:  [facility];  Service:    • Carpal tunnel release Bilateral      2016   • Coronary artery bypass graft N/A 10/27/2016     Procedure: TONNY, MEDIAN STERNOTOMY, CORONARY ARTERY BYPASS GRAFT X 3   WITH LEFT INTERNAL MAMMARY ARTERY GRAFT AND ENDOSCOPIC VEIN HARVEST OF THE RIGHT GREATER SAPHENOUS VEIN, WITH EXPLORATION OF THE LEFT GREATER SAPHENOUS VEIN;  Surgeon: Christophe Noe MD;  Location:  [facility];  Service:          Family History   Problem Relation Age of Onset   • Coronary artery disease Maternal Aunt    • Coronary artery disease Maternal Uncle    • Alzheimer's  disease Sister    • Macular degeneration Sister    • Cancer Mother    • Cancer Father          Social History     Social History   • Marital status:      Spouse name: N/A   • Number of children: 0   • Years of education: N/A     Occupational History   • [employer] in [employer] Retired     Social History Main Topics   • Smoking status: Never Smoker   • Smokeless tobacco: Never Used   • Alcohol use No   • Drug use: No   • Sexual activity: Defer     Other Topics Concern   • None     Social History Narrative    Patient drinks  2-3 servings of caffeine per day.    Patient lives alone at her home due to  residing at the nursing home.        Current Outpatient Prescriptions:   •  acetaminophen (TYLENOL) 500 MG tablet, Take 500 mg by mouth 2 (Two) Times a Day., Disp: , Rfl:   •  acetaminophen-codeine (TYLENOL #3) 300-30 MG per tablet, , Disp: , Rfl:   •  aspirin 81 MG EC tablet, Take 81 mg by mouth Daily., Disp: , Rfl:   •  Calcium-Magnesium-Vitamin D (CALCIUM 500 PO), Take 2 tablets by mouth Daily., Disp: , Rfl:   •  escitalopram (LEXAPRO) 10 MG tablet, Take 1 tablet by mouth Daily., Disp: 30 tablet, Rfl: 5  •  furosemide (LASIX) 40 MG tablet, Take 40 mg by mouth 2 (Two) Times a Day., Disp: , Rfl:   •  Glucosamine-Chondroitin (OSTEO BI-FLEX REGULAR STRENGTH PO), Take 1,500 mg by mouth Daily., Disp: , Rfl:   •  HYDROcodone-acetaminophen (NORCO) 5-325 MG per tablet, Take 1 tablet by mouth Every 8 (Eight) Hours As Needed for moderate pain (4-6)., Disp: 60 tablet, Rfl: 0  •  insulin aspart (NOVOLOG) 100 UNIT/ML injection, Inject  under the skin 3 (Three) Times a Day Before Meals. SSI based on carbs, Disp: , Rfl:   •  insulin glargine (LANTUS) 100 UNIT/ML injection, Inject 24 Units under the skin Every Night., Disp: , Rfl:   •  losartan (COZAAR) 25 MG tablet, Take 4 tablets by mouth Daily., Disp: 30 tablet, Rfl: 11  •  metoprolol tartrate (LOPRESSOR) 25 MG tablet, Take 0.5 tablets by mouth Every 12  "(Twelve) Hours., Disp: 60 tablet, Rfl: 11  •  Multiple Vitamins-Minerals (MULTIVITAMIN ADULT PO), Take 1 tablet by mouth Daily., Disp: , Rfl:   •  pravastatin (PRAVACHOL) 20 MG tablet, Take 20 mg by mouth Daily., Disp: , Rfl:   •  raNITIdine (ZANTAC) 150 MG tablet, TAKE 1 TABLET EVERY 12 HOURS FOR STOMACH, Disp: 60 tablet, Rfl: 0      Allergies   Allergen Reactions   • Augmentin [Amoxicillin-Pot Clavulanate] Rash   • Cephalexin Rash      Visit Vitals   • /80 (BP Location: Right arm, Patient Position: Sitting)   • Pulse 75   • Temp 97.6 °F (36.4 °C) (Temporal Artery )   • Resp 18   • Ht 60\" (152.4 cm)   • Wt 156 lb (70.8 kg)   • SpO2 96%   • BMI 30.47 kg/m2      Physical Exam:  Constitutional: Patient is oriented to person, place, and time. Vital signs are normal. Patient appears well-developed and well-nourished.   HENT: Head: Normocephalic and atraumatic. Eyes: Conjunctivae and lids are normal. Pupils: Equal, round, reactive to light.   Neck: Trachea normal. Neck supple. No JVD present.   Pulmonary Respiratory effort: No increased work of breathing or signs of respiratory distress. Auscultation of lungs: Clear to auscultation.   Cardiovascular Auscultation of heart: Normal rate and rhythm, normal S1 and S2, no murmurs.   Peripheral vascular exam: Normal. No edema.   Abdomen: The abdomen was soft and nontender. Bowel sounds were normal.   Musculoskeletal   Gait and station: Gait evaluation demonstrated a normal gait.   Lumbar spine: The range of motion of the lumbar spine is full and without pain. Extension, flexion, lateral flexion, rotation of the lumbar spine did not increase or reproduce pain. Lumbar facet joint loading maneuvers are negative.   Faizan and Gaenslen's tests are negative   Piriformis maneuvers are negative   Palpation of the bilateral ischial tuberosities, unrevealing   Palpation of the bilateral greater trochanter, unrevealing   Examination of the Iliotibial band: unrevealing   Hip joints: " The range of motion of the hip joints is full and without pain   Neurological: Patient is alert and oriented to person, place, and time. Speech: speech is normal. Cortical function: Normal mental status.   Cranial nerves: Cranial nerves 2-12 intact.   Reflex Scores:  Right patellar: 1+  Left patellar: 1+  Right achilles: 1+  Left achilles: 1+  Motor strength: 5/5  Motor Tone: normal tone.   Involuntary movements: none.   Superficial/Primitive Reflexes: primitive reflexes were absent.   Right Lobato: absent  Left Lobato: absent  Right ankle clonus: absent  Left ankle clonus: absent   No nuchal rigidity. Negative Kernig and Brudzinski.  Spurling sign is negative. Lhermitte sign is negative. Negative long tract signs. Straight leg raising test is negative. Femoral stretch sign is negative.   Sensation: No sensory loss. Sensory exam: intact to light touch, intact pain and temperature sensation, intact vibration sensation and normal proprioception.   Coordination: Normal finger to nose and heel to shin. Normal balance and negative. Romberg's sign negative.   Skin and subcutaneous tissue: Skin is warm and intact. No rash noted. No cyanosis.   Psychiatric:   Judgment and insight: Normal.   Orientation to person, place and time: Normal.   Recent and remote memory: Intact.   Mood and affect: Normal.     ASSESSMENT:   1. Lumbar stenosis with neurogenic claudication    2. Myofascial pain    3. Diabetes mellitus type 1 with complications    4. Diabetic polyneuropathy associated with type 1 diabetes mellitus    5. S/P CABG x 3    6. Nonrheumatic aortic valve stenosis    7. Carpal tunnel syndrome, bilateral    8. Anxiety and depression    9. Insomnia, unspecified type      PLAN/MEDICAL DECISION MAKING: I had a lengthy conversation with Ms. Debora FREEMAN  regarding her chronic pain condition and potential therapeutic options including risks, benefits, alternative therapies, to name a few. Patient has failed to obtain pain relief  with conservative measures and interventional pain management measures, as referenced above I have reviewed all available patient's medical records as well as previous therapies as referenced above. Patient presents with significant comorbidity and has been found not to be a surgical candidate. Patient reports some intolerance to steroids, besides her IDDM/on insulin pump.  Therefore, I have proposed the following plan:  1. Diagnostic studies:  A. MRI of the thoracic spine without contrast  B. MRI of the lumbar spine without contrast  C. CBC, PT, PTT  2. Long-term rehabilitation efforts:  A. The patient does not have a history of falls. I did complete a risk assessment for falls.   Fall precautions: Patient has been instructed regarding universal fall precautions, such as;  · Using gait aids a cane or a rolling walker at the appropriate height at all times for ambulation   · Removing all area rugs and coffee tables to create a safe environment at home  · Ensure clean, dry floors  · Wearing supportive footwear and properly fitting clothing  · Ensure bed/chair is appropriate height and patient's feet can touch the floor  · Using a shower transfer bench  · Using walk-in shower and having shower safety bars installed  · Ensure proper lighting, minimize glare  · Have nightlights operational and in use  · Participation in an exercise program for gait training, balance training and strength  · Avoid carrying laundry up and down steps  · Ensure proper compliance and organization of medications to avoid errors   · Avoid use of over the counter sedatives and alcohol consumption  · Ensure easy access to call bell, glasses, TV control, telephone  · Ensure glasses/hearing aids are in use or close by (on top of night table)  B.  Patient will start a comprehensive physical therapy program for water therapy, core strengthening and gait and balance training once pain is under control  C.  Start an exercise program such as yoga and  water therapy  D.  Referral to Dr. Abdulaziz Kennedy for psychological screening for spinal cord stimulation therapy.  3.   Pharmacological measures: Deferred  4.   Interventional pain management measures: Patient will be scheduled for a spinal cord stimulator trial with [facility]. Patient has received an educational DVD on spinal cord stimulation therapies.  5. The patient and her family have been instructed to contact my office with any questions or difficulties. The patient understands the plan and agrees to proceed accordingly.       Patient Care Team:  Sanjiv Mo MD as PCP - General (Adolescent Medicine)  Tai Segal MD as PCP - Family Medicine  Christophe Noe MD as Surgeon (Cardiothoracic Surgery)  Lamberto Ellison MD as Consulting Physician (Hand Surgery)  Yao Stewart MD as Consulting Physician (Pain Medicine)     No orders of the defined types were placed in this encounter.        Future Appointments  Date Time Provider Department Center   3/8/2017 11:20 AM [facility]   7/17/2017 1:45 PM Gama Elmore MD [facility] None         Yao Stewart MD     EMR Dragon/Transcription disclaimer:  Much of this encounter note is an electronic transcription of spoken language to printed text. Electronic transcription of spoken language may permit erroneous, or at times, nonsensical words or phrases to be inadvertently transcribed. Although I have reviewed the note for such errors, some may still exist.

## 2017-04-03 NOTE — TELEPHONE ENCOUNTER
----- Message from Renetta Dmitry sent at 4/3/2017 12:18 PM EDT -----  Regarding: MED REFILL  Contact: [phone]  PATIENT IS ALLO OUT OF HER RANITIDINE 150 MG. PATIENT WOULD LIKE A REFILL CALLED INTO [facility] ON FILE.

## 2017-06-05 NOTE — PROGRESS NOTES
"Raqeul Owen is a 81 y.o. female.     Diabetes   She presents for her follow-up diabetic visit. She has type 1 diabetes mellitus. Her disease course has been stable. There are no hypoglycemic associated symptoms. There are no diabetic associated symptoms. Pertinent negatives for diabetes include no chest pain. There are no hypoglycemic complications. Diabetic complications include heart disease and peripheral neuropathy. Risk factors for coronary artery disease include diabetes mellitus. Current diabetic treatment includes insulin injections (Lantus and NovoLog.through insulin pump). She is compliant with treatment all of the time. She is following a generally healthy diet. She participates in exercise intermittently. Her home blood glucose trend is decreasing steadily. Eye exam is current.      Hemoglobin A1c is 7.6%.  She'll be seeing [facility] on July.  Saw Dr. Millicent Sandoval regarding her redness in March and will be seeing Dr. Lepe today for general eye exam.    The following portions of the patient's history were reviewed and updated as appropriate: allergies, current medications, past social history and problem list.    Review of Systems   Respiratory: Negative for shortness of breath.    Cardiovascular: Negative for chest pain.       Objective   /80  Pulse 71  Temp 99.1 °F (37.3 °C)  Ht 60\" (152.4 cm)  Wt 155 lb (70.3 kg)  SpO2 94%  BMI 30.27 kg/m2  Physical Exam   Constitutional: She appears well-developed and well-nourished.   Cardiovascular: Normal rate, regular rhythm and normal heart sounds.    Pulmonary/Chest: Effort normal and breath sounds normal.   Abdominal: Soft. There is no tenderness. A hernia (ventral.) is present.   Nursing note and vitals reviewed.      Assessment/Plan   Problem List Items Addressed This Visit        Digestive    Gastroesophageal reflux disease       Endocrine    Type I diabetes mellitus - Primary (Chronic)    Relevant Orders "    POC Glycosylated Hemoglobin (Hb A1C) (Completed)      Other Visit Diagnoses     Reactive depression        Ventral hernia without obstruction or gangrene                  Drink plenty fluids.    Continue medications as doing.    Refill Escitalopram 10 mg daily #30+11.  Refill Ranitidine 150 mg twice a day #60+11.    Follow up with Dr Desai in July as scheduled.    Follow up in 6 months.      Scribed for Dr Tai Segal by Kirstin Medeiros CMA.            I, Tai Segal MD, personally performed the services described in this documentation, as scribed by Kirstin Medeiros in my presence, and is both accurate and complete.

## 2017-07-17 PROBLEM — I10 ESSENTIAL HYPERTENSION: Status: ACTIVE | Noted: 2017-01-01

## 2017-07-17 NOTE — PROGRESS NOTES
[facility] AT [address], [address]  [place], [place], [address]    [phone]  [email or web address]           OUTPATIENT CLINIC FOLLOW UP NOTE    Encounter Date:01/16/2017    Patient Care Team:  Patient Care Team:  Sanjiv Mo MD as PCP - General (Adolescent Medicine)  Tai Segal MD as PCP - Family Medicine  Tai Segal MD as PCP - Claims Attributed  Christophe Noe MD as Surgeon (Cardiothoracic Surgery)  Lamberto Ellison MD (Inactive) as Consulting Physician (Hand Surgery)  Yao Stewart MD as Consulting Physician (Pain Medicine)    Subjective:   Reason for consultation:   Chief Complaint   Patient presents with   • Coronary Artery Disease       HPI:    Debora Owen is a 81 y.o. female.  Coronary Artery Disease       The patient is an 81-year-old female with a history of an non-ST elevation MI in 10/2016 status post three-vessel bypass with Dr. Noe, postoperative atrial fibrillation, mild to moderate AS, who presents for follow-up.    Patient has done well since discharge and is currently without chest pain or exertional dyspnea suggestive of angina.  Additionally she has not had episodes of lightheadedness or syncope suggestive of progressive aortic stenosis.  Lastly she denies palpitations suggestive of recurrent atrial fibrillation now off of amiodarone therapy.      PFSH:  Patient Active Problem List   Diagnosis   • NSTEMI (non-ST elevated myocardial infarction)   • Type I diabetes mellitus   • S/P CABG x 3   • CAD (coronary artery disease)   • Postoperative anemia   • Atrial fibrillation   • Carpal tunnel syndrome, bilateral   • Gastroesophageal reflux disease   • Insomnia   • Neuropathy of hand   • Neuropathy of upper extremity   • Peripheral neuropathy   • SOB (shortness of breath)   • Nonrheumatic aortic valve stenosis   • Anxiety and depression   • Lumbar stenosis with neurogenic claudication   • Myofascial pain   • Diabetes mellitus type 1  with complications   • Diabetic polyneuropathy associated with type 1 diabetes mellitus         Current Outpatient Prescriptions:   •  acetaminophen (TYLENOL) 500 MG tablet, Take 500 mg by mouth 2 (Two) Times a Day., Disp: , Rfl:   •  acetaminophen-codeine (TYLENOL #3) 300-30 MG per tablet, Take 1 tablet by mouth Every 4 (Four) Hours As Needed., Disp: , Rfl:   •  aspirin 81 MG EC tablet, Take 81 mg by mouth Daily., Disp: , Rfl:   •  Calcium-Magnesium-Vitamin D (CALCIUM 500 PO), Take 2 tablets by mouth Daily., Disp: , Rfl:   •  escitalopram (LEXAPRO) 10 MG tablet, Take 1 tablet by mouth Daily., Disp: 30 tablet, Rfl: 11  •  Glucosamine-Chondroitin (OSTEO BI-FLEX REGULAR STRENGTH PO), Take 1,500 mg by mouth Daily., Disp: , Rfl:   •  insulin aspart (NOVOLOG) 100 UNIT/ML injection, Inject  under the skin 3 (Three) Times a Day Before Meals. SSI based on carbs, Disp: , Rfl:   •  insulin glargine (LANTUS) 100 UNIT/ML injection, Inject 24 Units under the skin Every Night., Disp: , Rfl:   •  losartan (COZAAR) 25 MG tablet, Take 4 tablets by mouth Daily., Disp: 30 tablet, Rfl: 11  •  metoprolol tartrate (LOPRESSOR) 25 MG tablet, Take 0.5 tablets by mouth Every 12 (Twelve) Hours., Disp: 60 tablet, Rfl: 11  •  Multiple Vitamins-Minerals (MULTIVITAMIN ADULT PO), Take 1 tablet by mouth Daily., Disp: , Rfl:   •  pravastatin (PRAVACHOL) 20 MG tablet, Take 20 mg by mouth Daily., Disp: , Rfl:   •  raNITIdine (ZANTAC) 150 MG tablet, Take 1 tablet by mouth 2 (Two) Times a Day., Disp: 60 tablet, Rfl: 11    Allergies   Allergen Reactions   • Augmentin [Amoxicillin-Pot Clavulanate] Rash   • Cephalexin Rash        reports that she has never smoked. She has never used smokeless tobacco.    Family History   Problem Relation Age of Onset   • Coronary artery disease Maternal Aunt    • Breast cancer Maternal Aunt    • Coronary artery disease Maternal Uncle    • Alzheimer's disease Sister    • Macular degeneration Sister    • Cancer Mother    •  "Cancer Father        Review of Systems:  Negative for exertional chest pain, dyspnea with exertion, orthopnea, PND, lower extremity edema, palpitations, lightheadedness, syncope.   Review of Systems  All other systems reviewed and are negative.    Objective:   Blood pressure 163/67, pulse 79, height 60\" (152.4 cm), weight 157 lb 6.4 oz (71.4 kg).  Physical Exam  CONSTITUTIONAL: No acute distress, normal affect  RESPIRATORY: Normal effort. Clear to auscultation bilaterally without wheezing or rales  CARDIOVASCULAR: Jugular venous pressure within normal limits. Carotids with normal upstrokes without bruits.  Regular rate and rhythm with normal S1. NAHOMI at RUSB with radiation to carotid. Soft but present S2. Without gallop or rub.  CHEST: Incision CDI  PERIPHERAL VASCULAR: Normal radial pulses bilaterally. There is no lower extremity edema bilaterally.    Labs:  Lab Results   Component Value Date    ALT 20 10/23/2016    AST 23 10/23/2016     Lab Results   Component Value Date    CHOL 190 10/19/2016    TRIG 124 10/19/2016    HDL 61 (H) 10/19/2016    LDLDIRECT 109 10/19/2016    CREATININE 0.90 10/31/2016       Diagnostic Data:    Procedures  Cath 10/2016  · Severe multi-vessel CAD involving the proximal LAD and mid circumflex  · Ostial 60% LAD stenosis followed by a 100% occluded proximal LAD just distal to the take off of D1 with predominantly right to left collateral filling of the mid to distal LAD.  · Functionally significant 60% mid circumflex stenosis with an iFR of 0.86  · Normal LVEF 60% with concentric hypertrophy but with no regional wall abnormalities  · Mitral annular calcification  · Mild aortic stenosis with a peak-to-peak gradient of 20mmHg    TTE 10/2016  · Left ventricular function is normal. Estimated EF = 65%.  · All left ventricular wall segments contract normally.  · Left ventricular diastolic dysfunction (grade I a) consistent with impaired relaxation.  · Mild mitral valve stenosis is present  · Mild " to moderate aortic valve stenosis is present.    Assessment and Plan:   Debora was seen today for follow-up.    Diagnoses and all orders for this visit:    Coronary artery disease involving native coronary artery of native heart without angina pectoris  -s/p 3V CABG Dr Noe 10/2016  -CCS class 0, continue current CAD medications  -Had a depressed mood after NSTEMI in 10/2016; improved with Lexapro; can consider role of metoprolol in future if mood continues to be an issue    Atrial fibrillation, unspecified type  -Very brief postoperative atrial fibrillation which reverted quickly to normal sinus rhythm on amiodarone.  Discontinued amiodarone in 1/2017  -Rate regular    Aortic Stenosis  -Mild to moderate in severity, no symptoms of aortic stenosis, repeat TTE to monitor progression in 10/2017    Essential hypertension  -BP is elevated today but has been fine and most recent visits.  Will monitor.  If at next visit it remains high, will consider changing metoprolol to carvedilol.    - Dietary and exercise counseling was provided during this visit  - Return in about 6 months (around 1/17/2018).    Gama Elmore MD, MSc, [facility]  Interventional Cardiology  [facility] at [facility]

## 2017-10-13 NOTE — TELEPHONE ENCOUNTER
Results of echo reviewed with patient.  All questions and concerns addressed at this time.  Understanding verbalized.

## 2018-01-01 ENCOUNTER — OFFICE VISIT (OUTPATIENT)
Dept: CARDIOLOGY | Facility: CLINIC | Age: 83
End: 2018-01-01

## 2018-01-01 VITALS
WEIGHT: 164 LBS | DIASTOLIC BLOOD PRESSURE: 74 MMHG | HEIGHT: 60 IN | HEART RATE: 85 BPM | SYSTOLIC BLOOD PRESSURE: 154 MMHG | OXYGEN SATURATION: 95 % | BODY MASS INDEX: 32.2 KG/M2

## 2018-01-01 DIAGNOSIS — I48.0 PAROXYSMAL ATRIAL FIBRILLATION (HCC): ICD-10-CM

## 2018-01-01 DIAGNOSIS — I35.0 NONRHEUMATIC AORTIC VALVE STENOSIS: ICD-10-CM

## 2018-01-01 DIAGNOSIS — E78.5 DYSLIPIDEMIA: Primary | ICD-10-CM

## 2018-01-01 DIAGNOSIS — I25.10 CORONARY ARTERY DISEASE INVOLVING NATIVE CORONARY ARTERY OF NATIVE HEART WITHOUT ANGINA PECTORIS: ICD-10-CM

## 2018-01-01 DIAGNOSIS — I10 ESSENTIAL HYPERTENSION: ICD-10-CM

## 2018-01-01 PROCEDURE — 99214 OFFICE O/P EST MOD 30 MIN: CPT | Performed by: INTERNAL MEDICINE

## 2018-01-29 PROBLEM — I35.0 NONRHEUMATIC AORTIC VALVE STENOSIS: Status: RESOLVED | Noted: 2018-01-01 | Resolved: 2018-01-01

## 2018-01-29 PROBLEM — I35.0 NONRHEUMATIC AORTIC VALVE STENOSIS: Status: ACTIVE | Noted: 2018-01-01

## 2018-01-29 NOTE — PROGRESS NOTES
[facility] AT [address], [address]  [place], [place], [address]    [phone]  WWW.[facility]"[employer]"[facility]           OUTPATIENT CLINIC FOLLOW UP NOTE    Patient Care Team:  Patient Care Team:  Sanjiv Mo MD as PCP - General (Adolescent Medicine)  Tai Segal MD as PCP - Family Medicine  Riolelo Guzman MD as PCP - Claims Attributed  Christophe Noe MD as Surgeon (Cardiothoracic Surgery)  Lamberto Ellison MD (Inactive) as Consulting Physician (Hand Surgery)  Yao Stewart MD as Consulting Physician (Pain Medicine)    Subjective:   Reason for consultation:   Chief Complaint   Patient presents with   • Chest Pain   • Shortness of Breath   • Coronary Artery Disease   • Atrial Fibrillation       HPI:    Debora Owen is a 82 y.o. female.  Chest Pain    Associated symptoms include shortness of breath.   Her past medical history is significant for CAD.   Shortness of Breath   Associated symptoms include chest pain. Her past medical history is significant for CAD.   Coronary Artery Disease   Symptoms include chest pain and shortness of breath.   Atrial Fibrillation   Symptoms include chest pain and shortness of breath. Past medical history includes atrial fibrillation and CAD.     The patient has a history of an non-ST elevation MI in 10/2016 status post three-vessel bypass with Dr. Noe, postoperative atrial fibrillation, mild to moderate AS, who presents for follow-up.    Patient has done well. Is currently without chest pain or exertional dyspnea suggestive of angina.  Additionally she has not had episodes of lightheadedness or syncope suggestive of progressive aortic stenosis.  Lastly she denies palpitations suggestive of recurrent atrial fibrillation now off of amiodarone therapy.      PFSH:  Patient Active Problem List   Diagnosis   • NSTEMI (non-ST elevated myocardial infarction)   • Type I diabetes mellitus   • S/P CABG x 3   • CAD (coronary artery disease)   •  Postoperative anemia   • Atrial fibrillation   • Carpal tunnel syndrome, bilateral   • Gastroesophageal reflux disease   • Insomnia   • Neuropathy of hand   • Neuropathy of upper extremity   • Peripheral neuropathy   • SOB (shortness of breath)   • Nonrheumatic aortic valve stenosis   • Anxiety and depression   • Lumbar stenosis with neurogenic claudication   • Myofascial pain   • Diabetes mellitus type 1 with complications   • Diabetic polyneuropathy associated with type 1 diabetes mellitus   • Essential hypertension         Current Outpatient Prescriptions:   •  acetaminophen (TYLENOL) 500 MG tablet, Take 500 mg by mouth 2 (Two) Times a Day., Disp: , Rfl:   •  aspirin 81 MG EC tablet, Take 81 mg by mouth Daily., Disp: , Rfl:   •  Calcium-Magnesium-Vitamin D (CALCIUM 500 PO), Take 2 tablets by mouth Daily., Disp: , Rfl:   •  escitalopram (LEXAPRO) 10 MG tablet, Take 1 tablet by mouth Daily., Disp: 30 tablet, Rfl: 11  •  Glucosamine-Chondroitin (OSTEO BI-FLEX REGULAR STRENGTH PO), Take 1,500 mg by mouth Daily., Disp: , Rfl:   •  insulin aspart (NOVOLOG) 100 UNIT/ML injection, Inject  under the skin 3 (Three) Times a Day Before Meals. SSI based on carbs, Disp: , Rfl:   •  insulin glargine (LANTUS) 100 UNIT/ML injection, Inject 24 Units under the skin Every Night., Disp: , Rfl:   •  losartan (COZAAR) 25 MG tablet, Take 4 tablets by mouth Daily., Disp: 30 tablet, Rfl: 11  •  metoprolol tartrate (LOPRESSOR) 25 MG tablet, Take 0.5 tablets by mouth Every 12 (Twelve) Hours., Disp: 30 tablet, Rfl: 6  •  Multiple Vitamins-Minerals (MULTIVITAMIN ADULT PO), Take 1 tablet by mouth Daily., Disp: , Rfl:   •  pravastatin (PRAVACHOL) 20 MG tablet, Take 20 mg by mouth Daily., Disp: , Rfl:   •  raNITIdine (ZANTAC) 150 MG tablet, Take 1 tablet by mouth 2 (Two) Times a Day., Disp: 60 tablet, Rfl: 11    Allergies   Allergen Reactions   • Augmentin [Amoxicillin-Pot Clavulanate] Rash   • Cephalexin Rash        reports that she has never  "smoked. She has never used smokeless tobacco.    Family History   Problem Relation Age of Onset   • Coronary artery disease Maternal Aunt    • Breast cancer Maternal Aunt    • Coronary artery disease Maternal Uncle    • Alzheimer's disease Sister    • Macular degeneration Sister    • Cancer Mother    • Cancer Father        Review of Systems:  Negative for exertional chest pain, dyspnea with exertion, orthopnea, PND, lower extremity edema, palpitations, lightheadedness, syncope.   Review of Systems   Respiratory: Positive for shortness of breath.    Cardiovascular: Positive for chest pain.     All other systems reviewed and are negative.    Objective:   Blood pressure 154/74, pulse 85, height 152.4 cm (60\"), weight 74.4 kg (164 lb), SpO2 95 %.  Physical Exam  CONSTITUTIONAL: No acute distress, normal affect  RESPIRATORY: Normal effort. Clear to auscultation bilaterally without wheezing or rales  CARDIOVASCULAR: Carotids with normal upstrokes without bruits.  Regular rate and rhythm with normal S1. NAHOMI at RUSB with radiation to carotids. Soft but present S2. Without gallop or rub.  CHEST: Incision CDI  PERIPHERAL VASCULAR: Normal radial pulses bilaterally. There is no lower extremity edema bilaterally.    Labs:  Lab Results   Component Value Date    ALT 20 10/23/2016    AST 23 10/23/2016     Lab Results   Component Value Date    CHOL 190 10/19/2016    TRIG 124 10/19/2016    HDL 61 (H) 10/19/2016    LDLDIRECT 109 10/19/2016    CREATININE 0.90 10/31/2016       Diagnostic Data:    Procedures  Cath 10/2016  · Severe multi-vessel CAD involving the proximal LAD and mid circumflex  · Ostial 60% LAD stenosis followed by a 100% occluded proximal LAD just distal to the take off of D1 with predominantly right to left collateral filling of the mid to distal LAD.  · Functionally significant 60% mid circumflex stenosis with an iFR of 0.86  · Normal LVEF 60% with concentric hypertrophy but with no regional wall " abnormalities  · Mitral annular calcification  · Mild aortic stenosis with a peak-to-peak gradient of 20mmHg    TTE 10/2016  · Left ventricular function is normal. Estimated EF = 65%.  · All left ventricular wall segments contract normally.  · Left ventricular diastolic dysfunction (grade I a) consistent with impaired relaxation.  · Mild mitral valve stenosis is present  · Mild to moderate aortic valve stenosis is present.    Assessment and Plan:   Debora was seen today for follow-up.    Diagnoses and all orders for this visit:    Coronary artery disease involving native coronary artery of native heart without angina pectoris  Mixed hyperlipidemia  -s/p 3V CABG Dr Noe 10/2016  -CCS class 0, continue current CAD medications  -Had a depressed mood after NSTEMI in 10/2016; improved with Lexapro; can consider role of metoprolol in future if mood continues to be an issue  -Repeat Lipid panel/LFTs    Atrial fibrillation, unspecified type  -Very brief postoperative atrial fibrillation which reverted quickly to normal sinus rhythm on amiodarone.  Discontinued amiodarone in 1/2017  -Rate regular, continue metoprolol, ASA    Aortic Stenosis  -Mild to moderate in severity, no symptoms of aortic stenosis, repeat TTE to monitor progression in 10/2017    Essential hypertension  -BP is elevated again today. Double Lopressor to 25mg BID    - Return in about 6 months (around 7/29/2018).    Gama Elmore MD, MSc, [facility]  Interventional Cardiology  [facility] at [facility]